# Patient Record
Sex: MALE | Race: BLACK OR AFRICAN AMERICAN | Employment: FULL TIME | URBAN - METROPOLITAN AREA
[De-identification: names, ages, dates, MRNs, and addresses within clinical notes are randomized per-mention and may not be internally consistent; named-entity substitution may affect disease eponyms.]

---

## 2017-02-20 PROBLEM — E78.00 PURE HYPERCHOLESTEROLEMIA: Status: ACTIVE | Noted: 2017-02-20

## 2017-02-20 PROBLEM — R73.03 PREDIABETES: Status: ACTIVE | Noted: 2017-02-20

## 2017-02-20 PROBLEM — N40.0 BPH (BENIGN PROSTATIC HYPERTROPHY): Status: ACTIVE | Noted: 2017-02-20

## 2017-02-20 PROBLEM — D72.819 LEUKOPENIA: Status: ACTIVE | Noted: 2017-02-20

## 2017-02-20 PROBLEM — R97.20 ELEVATED PSA: Status: ACTIVE | Noted: 2017-02-20

## 2017-02-21 ENCOUNTER — OFFICE VISIT (OUTPATIENT)
Dept: FAMILY MEDICINE CLINIC | Age: 67
End: 2017-02-21

## 2017-02-21 VITALS
HEIGHT: 76 IN | TEMPERATURE: 98.1 F | WEIGHT: 202 LBS | RESPIRATION RATE: 18 BRPM | DIASTOLIC BLOOD PRESSURE: 80 MMHG | HEART RATE: 66 BPM | BODY MASS INDEX: 24.6 KG/M2 | OXYGEN SATURATION: 98 % | SYSTOLIC BLOOD PRESSURE: 124 MMHG

## 2017-02-21 DIAGNOSIS — R97.20 ELEVATED PSA: ICD-10-CM

## 2017-02-21 DIAGNOSIS — R73.03 PREDIABETES: ICD-10-CM

## 2017-02-21 DIAGNOSIS — J30.89 SEASONAL ALLERGIC RHINITIS DUE TO OTHER ALLERGIC TRIGGER: ICD-10-CM

## 2017-02-21 DIAGNOSIS — R63.4 WEIGHT LOSS: ICD-10-CM

## 2017-02-21 DIAGNOSIS — Z12.11 COLON CANCER SCREENING: ICD-10-CM

## 2017-02-21 DIAGNOSIS — E78.00 PURE HYPERCHOLESTEROLEMIA: ICD-10-CM

## 2017-02-21 DIAGNOSIS — D70.8 OTHER NEUTROPENIA (HCC): ICD-10-CM

## 2017-02-21 DIAGNOSIS — Z00.00 PREVENTATIVE HEALTH CARE: Primary | ICD-10-CM

## 2017-02-21 DIAGNOSIS — Z23 ENCOUNTER FOR IMMUNIZATION: ICD-10-CM

## 2017-02-21 DIAGNOSIS — N40.1 BENIGN PROSTATIC HYPERPLASIA WITH LOWER URINARY TRACT SYMPTOMS, UNSPECIFIED MORPHOLOGY: ICD-10-CM

## 2017-02-21 PROBLEM — J30.2 SEASONAL ALLERGIC RHINITIS: Status: ACTIVE | Noted: 2017-02-21

## 2017-02-21 RX ORDER — FLUTICASONE PROPIONATE 50 MCG
2 SPRAY, SUSPENSION (ML) NASAL DAILY
Qty: 3 BOTTLE | Refills: 3 | Status: SHIPPED | OUTPATIENT
Start: 2017-02-21

## 2017-02-21 RX ORDER — RANITIDINE 150 MG/1
150 TABLET, FILM COATED ORAL
COMMUNITY
Start: 2017-02-21

## 2017-02-21 NOTE — PROGRESS NOTES
Chief Complaint   Patient presents with    Complete Physical     fasting today except little coffee and cream and sugar       Reviewed Record in preparation for visit and have obtained necessary documentation. Identified pt with two pt identifiers (Name @ )    Health Maintenance Due   Topic    Pneumococcal 19-64 Highest Risk (1 of 3 - PCV13)    ZOSTER VACCINE AGE 60>     INFLUENZA AGE 9 TO ADULT          1. Have you been to the ER, urgent care clinic since your last visit? Hospitalized since your last visit? No    2. Have you seen or consulted any other health care providers outside of the [address] [place] [address] since your last visit? Include any pap smears or colon screening.  No

## 2017-02-21 NOTE — MR AVS SNAPSHOT
Visit Information Date & Time Provider Department Dept. Phone Encounter #  
 2/21/2017  9:15 AM Kapil Wahl, [address] [phone] [id number] Follow-up Instructions Return in about 6 months (around 8/21/2017). Upcoming Health Maintenance Date Due Pneumococcal 19-64 Highest Risk (1 of 3 - PCV13) 10/24/1971 ZOSTER VACCINE AGE 60> 10/24/2012 COLONOSCOPY 6/1/2019 DTaP/Tdap/Td series (2 - Td) 2/8/2022 Allergies as of 2/21/2017  Review Complete On: 2/21/2017 By: Kapil Wahl MD  
 No Known Allergies Current Immunizations  Reviewed on 2/8/2012 Name Date Influenza Vaccine (Quad) PF 2/21/2017 Influenza Vaccine Split 12/1/2011 TDAP Vaccine 2/8/2012 Not reviewed this visit You Were Diagnosed With   
  
 Codes Comments Preventative health care    -  Primary ICD-10-CM: Z00.00 ICD-9-CM: V70.0 Encounter for immunization     ICD-10-CM: U96 ICD-9-CM: V03.89 Benign prostatic hyperplasia with lower urinary tract symptoms, unspecified morphology     ICD-10-CM: N40.1 ICD-9-CM: 600.01 Prediabetes     ICD-10-CM: R73.03 
ICD-9-CM: 790.29 Pure hypercholesterolemia     ICD-10-CM: E78.00 ICD-9-CM: 272.0 Weight loss     ICD-10-CM: R63.4 ICD-9-CM: 783.21 Other neutropenia ([facility] [address]     ICD-10-CM: D70.8 ICD-9-CM: 288.09 Seasonal allergic rhinitis due to other allergic trigger     ICD-10-CM: J30.89 Colon cancer screening     ICD-10-CM: Z12.11 ICD-9-CM: V76.51 Elevated PSA     ICD-10-CM: R97.20 ICD-9-CM: 790.93 Vitals BP Pulse Temp Resp Height(growth percentile) Weight(growth percentile) 124/80 (BP 1 Location: Right arm, BP Patient Position: Sitting) 66 98.1 °F (36.7 °C) (Oral) 18 6' 4\" (1.93 m) 202 lb (91.6 kg) SpO2 BMI Smoking Status 98% 24.59 kg/m2 Never Smoker Vitals History BMI and BSA Data Body Mass Index Body Surface Area 24.59 kg/m 2 2.22 m 2 [id number] Didier Almanza, [address] [place] [address] [phone] Your Updated Medication List  
  
   
This list is accurate as of: 17 10:00 AM.  Always use your most recent med list.  
  
  
  
  
 aspirin 81 mg chewable tablet Take 81 mg by mouth daily. fluticasone 50 mcg/actuation nasal spray Commonly known as:  Kamrane [place] 2 Sprays by Both Nostrils route daily. varicella zoster vacine live 19,400 unit/0.65 mL Susr injection Commonly known as:  varicella-zoster vacine live 1 Vial by SubCUTAneous route once for 1 dose. ZANTAC 150 mg tablet Generic drug:  raNITIdine Take 1 Tab by mouth two (2) times daily as needed for Indigestion. For GERD Prescriptions Sent to Pharmacy Refills  
 fluticasone (FLONASE) 50 mcg/actuation nasal spray 3 Si Sprays by Both Nostrils route daily. Class: Normal  
 [facility] [address] [place] [address] Ph #: [phone] Route: Both Nostrils  
 varicella zoster vacine live (VARICELLA-ZOSTER VACINE LIVE) 19,400 unit/0.65 mL susr injection 0 Si Vial by SubCUTAneous route once for 1 dose. Class: Normal  
 [facility] [address] [place] [address] Ph #: [phone] Route: SubCUTAneous We Performed the Following CBC WITH AUTOMATED DIFF [34384 CPT(R)] HEMOGLOBIN A1C WITH EAG [49645 CPT(R)] INFLUENZA VIRUS VAC QUAD,SPLIT,PRESV FREE SYRINGE 3/> YRS IM [id number] CPT(R)] LIPID PANEL [94568 CPT(R)] METABOLIC PANEL, COMPREHENSIVE [58755 CPT(R)] PSA W/ REFLX FREE PSA [54767 CPT(R)] REFERRAL TO GASTROENTEROLOGY [BHQ52 Custom] Comments:  
 Please evaluate patient for screening TSH RFX ON ABNORMAL TO FREE T4 [[id number] Custom] Follow-up Instructions Return in about 6 months (around 8/21/2017). To-Do List   
 02/21/2017 Imaging:  XR CHEST PA LAT Referral Information Referral ID Referred By Referred To  
  
 [id number] [facility] [address] Minna Ortega [phone] Maite Mcdonnell6 Delvis Buitrago Visits Status Start Date End Date 1 New Request 2/21/17 2/21/18 If your referral has a status of pending review or denied, additional information will be sent to support the outcome of this decision. Introducing [facility]! Dear Shoaib Mouar: Thank you for requesting a [employer] account. Our records indicate that you already have an active [employer] account. You can access your account anytime at [email or web address] Did you know that you can access your hospital and ER discharge instructions at any time in [employer]? You can also review all of your test results from your hospital stay or ER visit. Additional Information If you have questions, please visit the Frequently Asked Questions section of the [employer] website at [email or web address] Remember, [employer] is NOT to be used for urgent needs. For medical emergencies, dial 911. Now available from your iPhone and Android! Please provide this summary of care documentation to your next provider. Your primary care clinician is listed as Ulises Barba. If you have any questions after today's visit, please call [phone].

## 2017-02-21 NOTE — PROGRESS NOTES
Leandro Cartwright [facility]  [address] Sathya, [address]  [phone]             Date of visit: 17   Subjective:      History obtained from:  wife and the patient. Carlin Delgado is a 61 y.o. male who presents today for f/u chronic problems  Urinating frequently at night 1-2x  Worse if taking baby aspirin daily, up to 5x per night he thinks  Did get prostate biopsy, was ok    To see urology next month  Has been on lots of different pills, tried them 6-9 mo, none really helped    still on vit d for history of deficiency    Walks for exercise   Says he has lost weight, not intentionally, due to being busy and not time to eat  Wife concerned about his weight loss  He has been busy supporting wife, her mom had been living with them for 5 years and  in Nov    Admits to acid reflux  Stomach has bothered him his whole life, not worse  Hasn't had it in a while, has to avoid certain foods, not on a med other than prn zantac, maybe once every 6 weeks    Had coffee with a little sugar and cream this am    Does have allergies, takes zyrtec prn, does tell it affects his prostate some    Patient Active Problem List    Diagnosis Date Noted    Pure hypercholesterolemia 2017     Priority: 1 - One    Prediabetes 2017     Priority: 1 - One    Leukopenia 2017     Priority: 1 - One    Elevated PSA 2017     Priority: 1 - One    Vitamin D deficiency 02/10/2012     Priority: 2 - Two    BPH (benign prostatic hypertrophy) 2017     Priority: 3 - Three    Seasonal allergic rhinitis 2017     Current Outpatient Prescriptions   Medication Sig Dispense Refill    fluticasone (FLONASE) 50 mcg/actuation nasal spray 2 Sprays by Both Nostrils route daily. 3 Bottle 3    raNITIdine (ZANTAC) 150 mg tablet Take 1 Tab by mouth two (2) times daily as needed for Indigestion. For GERD      aspirin 81 mg chewable tablet Take 81 mg by mouth daily.        No Known Allergies  Past Medical History:   Diagnosis Date    Environmental allergies      Past Surgical History:   Procedure Laterality Date    COLONOSCOPY  2008    HX ORTHOPAEDIC  2008    both feet :toe realignment    HX WISDOM TEETH EXTRACTION       Family History   Problem Relation Age of Onset   Hero Hu Diabetes Mother     Hypertension Mother     Hypertension Father      Social History   Substance Use Topics    Smoking status: Never Smoker    Smokeless tobacco: Never Used    Alcohol use Yes      Comment: wine 1-2 glasses most nights      Social History     Social History Narrative    Semi-retired    Works as manager at clothing store            Review of Systems  Card: denies chest pain  Pulm: denies shortness of breath  GI denies constipation or diarrhea or bleeding  Skin: recently had rash on penis but is better  MSK:denies joint pains     Objective:     Vitals:    02/21/17 0920   BP: 124/80   Pulse: 66   Resp: 18   Temp: 98.1 °F (36.7 °C)   TempSrc: Oral   SpO2: 98%   Weight: 202 lb (91.6 kg)   Height: 6' 4\" (1.93 m)     Body mass index is 24.59 kg/(m^2). General: stated age, well-developed, and in NAD  Eyes: PERRL, EOMI, no redness or drainage  Nose: no drainage  Mouth: no lesions  Throat: erythema, exudate or swelling  Neck: supple, symmetrical, trachea midline, no adenopathy and thyroid: not enlarged, symmetric, no tenderness/mass/nodules  Lungs:  clear to auscultation w/o rales, rhonchi, wheezes w/normal effort and no use of accessory muscles of respiration   Heart: regular rate and rhythm, S1, S2 normal, no murmur, click, rub or gallop  Abdomen: soft, nontender, no masses, BS normal  Ext:  No edema noted.    Lymph: no cervical adenopathy appreciated  Skin:  Normal. and no rash or abnormalities   Neuro: normal gait, CN 2-12 intact  Psych: alert and oriented to person, place, time and situation and Speech: appropriate quality, quantity and organization of sentences    Lab Results   Component Value Date/Time Hemoglobin A1c 5.8 02/21/2017 10:09 AM     Lab Results   Component Value Date/Time    Cholesterol, total 187 02/21/2017 10:09 AM    HDL Cholesterol 73 02/21/2017 10:09 AM    LDL, calculated 99 02/21/2017 10:09 AM    VLDL, calculated 15 02/21/2017 10:09 AM    Triglyceride 77 02/21/2017 10:09 AM     Lab Results   Component Value Date/Time    Sodium 144 02/21/2017 10:09 AM    Potassium 4.9 02/21/2017 10:09 AM    Chloride 101 02/21/2017 10:09 AM    CO2 27 02/21/2017 10:09 AM    Glucose 89 02/21/2017 10:09 AM    BUN 13 02/21/2017 10:09 AM    Creatinine 1.11 02/21/2017 10:09 AM    BUN/Creatinine ratio 12 02/21/2017 10:09 AM    GFR est AA 81 02/21/2017 10:09 AM    GFR est non-AA 70 02/21/2017 10:09 AM    Calcium 9.7 02/21/2017 10:09 AM    Bilirubin, total 0.5 02/21/2017 10:09 AM    AST (SGOT) 18 02/21/2017 10:09 AM    Alk. phosphatase 56 02/21/2017 10:09 AM    Protein, total 7.5 02/21/2017 10:09 AM    Albumin 4.5 02/21/2017 10:09 AM    A-G Ratio 1.5 02/21/2017 10:09 AM    ALT (SGPT) 13 02/21/2017 10:09 AM     Lab Results   Component Value Date/Time    WBC 4.0 02/21/2017 10:09 AM    HGB 14.3 02/21/2017 10:09 AM    HCT 42.4 02/21/2017 10:09 AM    PLATELET 756 06/96/9948 10:09 AM    MCV 89 02/21/2017 10:09 AM     Lab Results   Component Value Date/Time    TSH 2.300 02/21/2017 10:09 AM    TSH 2.360 05/27/2015 08:24 AM     Lab Results   Component Value Date/Time    VITAMIN D, 25-HYDROXY 25.4 05/27/2015 08:24 AM         Assessment/Plan:       ICD-10-CM ICD-9-CM    1. Preventative health care Z00.00 V70.0 PSA W/ REFLX FREE PSA      CBC WITH AUTOMATED DIFF      METABOLIC PANEL, COMPREHENSIVE      LIPID PANEL      HEMOGLOBIN A1C WITH EAG      TSH RFX ON ABNORMAL TO FREE T4   2. Encounter for immunization Z23 V03.89 INFLUENZA VIRUS VAC QUAD,SPLIT,PRESV FREE SYRINGE 3/> YRS IM   3. Benign prostatic hyperplasia with lower urinary tract symptoms, unspecified morphology N40.1 600.01 PSA W/ REFLX FREE PSA   4.  Prediabetes R73.03 790.29 HEMOGLOBIN A1C WITH EAG   5. Pure hypercholesterolemia E78.00 272.0    6. Weight loss R63.4 783.21 XR CHEST PA LAT      TSH RFX ON ABNORMAL TO FREE T4   7. Other neutropenia ([facility] [address] D70.8 288.09    8. Seasonal allergic rhinitis due to other allergic trigger J30.89     9. Colon cancer screening Z12.11 V76.51 REFERRAL TO GASTROENTEROLOGY   10. Elevated PSA R97.20 790.93        Orders Placed This Encounter    XR CHEST PA LAT    Influenza virus vaccine (QUADRIVALENT PRES FREE SYRINGE) IM 3 years and older    PSA W/ REFLX FREE PSA    CBC WITH AUTOMATED DIFF    METABOLIC PANEL, COMPREHENSIVE    LIPID PANEL    HEMOGLOBIN A1C WITH EAG    TSH RFX ON ABNORMAL TO FREE T4    CVD REPORT    REFERRAL TO GASTROENTEROLOGY    fluticasone (FLONASE) 50 mcg/actuation nasal spray    raNITIdine (ZANTAC) 150 mg tablet    varicella zoster vacine live (VARICELLA-ZOSTER VACINE LIVE) 19,400 unit/0.65 mL susr injection     Need to catch up on preventive care  Weight loss not drastic but is unusual  Recommended colonoscopy  Also CXR and labs today  Recommended flonase instead of zyrtec for allergies as it won't affect prostate  He will follow up with urologist next month, but will do PSA today; previous normal biopsy    Discussed the diagnosis and plan and he expressed understanding. Follow-up Disposition:  Return in about 6 months (around 8/21/2017).     Julianna Runner, MD

## 2017-02-22 LAB
ALBUMIN SERPL-MCNC: 4.5 G/DL (ref 3.6–4.8)
ALBUMIN/GLOB SERPL: 1.5 {RATIO} (ref 1.1–2.5)
ALP SERPL-CCNC: 56 IU/L (ref 39–117)
ALT SERPL-CCNC: 13 IU/L (ref 0–44)
AST SERPL-CCNC: 18 IU/L (ref 0–40)
BASOPHILS # BLD AUTO: 0 X10E3/UL (ref 0–0.2)
BASOPHILS NFR BLD AUTO: 1 %
BILIRUB SERPL-MCNC: 0.5 MG/DL (ref 0–1.2)
BUN SERPL-MCNC: 13 MG/DL (ref 8–27)
BUN/CREAT SERPL: 12 (ref 10–22)
CALCIUM SERPL-MCNC: 9.7 MG/DL (ref 8.6–10.2)
CHLORIDE SERPL-SCNC: 101 MMOL/L (ref 96–106)
CHOLEST SERPL-MCNC: 187 MG/DL (ref 100–199)
CO2 SERPL-SCNC: 27 MMOL/L (ref 18–29)
CREAT SERPL-MCNC: 1.11 MG/DL (ref 0.76–1.27)
EOSINOPHIL # BLD AUTO: 0.4 X10E3/UL (ref 0–0.4)
EOSINOPHIL NFR BLD AUTO: 11 %
ERYTHROCYTE [DISTWIDTH] IN BLOOD BY AUTOMATED COUNT: 13.5 % (ref 12.3–15.4)
EST. AVERAGE GLUCOSE BLD GHB EST-MCNC: 120 MG/DL
GLOBULIN SER CALC-MCNC: 3 G/DL (ref 1.5–4.5)
GLUCOSE SERPL-MCNC: 89 MG/DL (ref 65–99)
HBA1C MFR BLD: 5.8 % (ref 4.8–5.6)
HCT VFR BLD AUTO: 42.4 % (ref 37.5–51)
HDLC SERPL-MCNC: 73 MG/DL
HGB BLD-MCNC: 14.3 G/DL (ref 12.6–17.7)
IMM GRANULOCYTES # BLD: 0 X10E3/UL (ref 0–0.1)
IMM GRANULOCYTES NFR BLD: 0 %
INTERPRETATION, 910389: NORMAL
LDLC SERPL CALC-MCNC: 99 MG/DL (ref 0–99)
LYMPHOCYTES # BLD AUTO: 1.7 X10E3/UL (ref 0.7–3.1)
LYMPHOCYTES NFR BLD AUTO: 42 %
MCH RBC QN AUTO: 29.9 PG (ref 26.6–33)
MCHC RBC AUTO-ENTMCNC: 33.7 G/DL (ref 31.5–35.7)
MCV RBC AUTO: 89 FL (ref 79–97)
MONOCYTES # BLD AUTO: 0.4 X10E3/UL (ref 0.1–0.9)
MONOCYTES NFR BLD AUTO: 10 %
NEUTROPHILS # BLD AUTO: 1.4 X10E3/UL (ref 1.4–7)
NEUTROPHILS NFR BLD AUTO: 36 %
PLATELET # BLD AUTO: 221 X10E3/UL (ref 150–379)
POTASSIUM SERPL-SCNC: 4.9 MMOL/L (ref 3.5–5.2)
PROT SERPL-MCNC: 7.5 G/DL (ref 6–8.5)
PSA SERPL-MCNC: 3.4 NG/ML (ref 0–4)
RBC # BLD AUTO: 4.79 X10E6/UL (ref 4.14–5.8)
REFLEX CRITERIA: NORMAL
SODIUM SERPL-SCNC: 144 MMOL/L (ref 134–144)
TRIGL SERPL-MCNC: 77 MG/DL (ref 0–149)
TSH SERPL DL<=0.005 MIU/L-ACNC: 2.3 UIU/ML (ref 0.45–4.5)
VLDLC SERPL CALC-MCNC: 15 MG/DL (ref 5–40)
WBC # BLD AUTO: 4 X10E3/UL (ref 3.4–10.8)

## 2022-03-18 PROBLEM — D72.819 LEUKOPENIA: Status: ACTIVE | Noted: 2017-02-20

## 2022-03-18 PROBLEM — E78.00 PURE HYPERCHOLESTEROLEMIA: Status: ACTIVE | Noted: 2017-02-20

## 2022-03-18 PROBLEM — R73.03 PREDIABETES: Status: ACTIVE | Noted: 2017-02-20

## 2022-03-19 PROBLEM — J30.2 SEASONAL ALLERGIC RHINITIS: Status: ACTIVE | Noted: 2017-02-21

## 2022-03-19 PROBLEM — R97.20 ELEVATED PSA: Status: ACTIVE | Noted: 2017-02-20

## 2023-05-24 RX ORDER — RANITIDINE 150 MG/1
150 TABLET ORAL 2 TIMES DAILY PRN
COMMUNITY
Start: 2017-02-21

## 2023-05-24 RX ORDER — ASPIRIN 81 MG/1
81 TABLET, CHEWABLE ORAL DAILY
COMMUNITY

## 2023-05-24 RX ORDER — FLUTICASONE PROPIONATE 50 MCG
2 SPRAY, SUSPENSION (ML) NASAL DAILY
COMMUNITY
Start: 2017-02-21